# Patient Record
Sex: FEMALE | Race: WHITE | NOT HISPANIC OR LATINO | Employment: FULL TIME | URBAN - METROPOLITAN AREA
[De-identification: names, ages, dates, MRNs, and addresses within clinical notes are randomized per-mention and may not be internally consistent; named-entity substitution may affect disease eponyms.]

---

## 2019-05-14 LAB
HUMAN PAPILLOMAVIRUS (HPV): POSITIVE
PAP RECOMMENDATION EXT: NORMAL
PAP SMEAR: NORMAL

## 2023-05-04 ENCOUNTER — TELEPHONE (OUTPATIENT)
Dept: FAMILY MEDICINE | Facility: CLINIC | Age: 33
End: 2023-05-04
Payer: COMMERCIAL

## 2023-05-04 NOTE — TELEPHONE ENCOUNTER
I spoke to the patient regarding her appointment on 5/9/23 informing her that this appointment will have to be in the clinic instead of virtual. Pt verbally understood.

## 2023-05-09 ENCOUNTER — OFFICE VISIT (OUTPATIENT)
Dept: FAMILY MEDICINE | Facility: CLINIC | Age: 33
End: 2023-05-09
Payer: COMMERCIAL

## 2023-05-09 VITALS
RESPIRATION RATE: 18 BRPM | HEART RATE: 66 BPM | WEIGHT: 268.19 LBS | OXYGEN SATURATION: 98 % | HEIGHT: 66 IN | DIASTOLIC BLOOD PRESSURE: 74 MMHG | TEMPERATURE: 98 F | SYSTOLIC BLOOD PRESSURE: 120 MMHG | BODY MASS INDEX: 43.1 KG/M2

## 2023-05-09 DIAGNOSIS — F43.10 PTSD (POST-TRAUMATIC STRESS DISORDER): ICD-10-CM

## 2023-05-09 DIAGNOSIS — G47.00 INSOMNIA, UNSPECIFIED TYPE: ICD-10-CM

## 2023-05-09 DIAGNOSIS — Z76.89 ENCOUNTER TO ESTABLISH CARE: Primary | ICD-10-CM

## 2023-05-09 DIAGNOSIS — F31.9 BIPOLAR 1 DISORDER: ICD-10-CM

## 2023-05-09 DIAGNOSIS — F32.A DEPRESSION, UNSPECIFIED DEPRESSION TYPE: ICD-10-CM

## 2023-05-09 PROCEDURE — 99999 PR PBB SHADOW E&M-EST. PATIENT-LVL IV: ICD-10-PCS | Mod: PBBFAC,,, | Performed by: FAMILY MEDICINE

## 2023-05-09 PROCEDURE — 99999 PR PBB SHADOW E&M-EST. PATIENT-LVL IV: CPT | Mod: PBBFAC,,, | Performed by: FAMILY MEDICINE

## 2023-05-09 PROCEDURE — 99203 PR OFFICE/OUTPT VISIT, NEW, LEVL III, 30-44 MIN: ICD-10-PCS | Mod: S$GLB,,, | Performed by: FAMILY MEDICINE

## 2023-05-09 PROCEDURE — 99203 OFFICE O/P NEW LOW 30 MIN: CPT | Mod: S$GLB,,, | Performed by: FAMILY MEDICINE

## 2023-05-09 RX ORDER — QUETIAPINE FUMARATE 200 MG/1
200 TABLET, FILM COATED ORAL NIGHTLY PRN
Qty: 30 TABLET | Refills: 5 | Status: SHIPPED | OUTPATIENT
Start: 2023-05-09 | End: 2024-02-01

## 2023-05-09 RX ORDER — CARIPRAZINE 1.5 MG/1
1.5 CAPSULE, GELATIN COATED ORAL DAILY
Qty: 90 CAPSULE | Refills: 3 | Status: SHIPPED | OUTPATIENT
Start: 2023-05-09 | End: 2023-08-16 | Stop reason: SDUPTHER

## 2023-05-09 RX ORDER — ESCITALOPRAM OXALATE 10 MG/1
10 TABLET ORAL DAILY
Qty: 90 TABLET | Refills: 3 | Status: SHIPPED | OUTPATIENT
Start: 2023-05-09 | End: 2024-03-05

## 2023-05-09 RX ORDER — CLONIDINE HYDROCHLORIDE 0.1 MG/1
0.1 TABLET ORAL 2 TIMES DAILY PRN
Qty: 60 TABLET | Refills: 5 | Status: SHIPPED | OUTPATIENT
Start: 2023-05-09 | End: 2024-05-08

## 2023-05-09 RX ORDER — NALTREXONE HYDROCHLORIDE 50 MG/1
50 TABLET, FILM COATED ORAL DAILY
Qty: 90 TABLET | Refills: 3 | Status: SHIPPED | OUTPATIENT
Start: 2023-05-09 | End: 2024-02-01 | Stop reason: SDUPTHER

## 2023-05-09 RX ORDER — PROPRANOLOL HYDROCHLORIDE 20 MG/1
20 TABLET ORAL 2 TIMES DAILY PRN
Qty: 60 TABLET | Refills: 5 | Status: SHIPPED | OUTPATIENT
Start: 2023-05-09 | End: 2024-02-01

## 2023-05-09 NOTE — PROGRESS NOTES
[facility] - Clinic Note    Subjective      Ms. Rosario is a 33 y.o. female who presents to clinic for Establish Care (REFILLS)    Patient has a history of bipolar disorder, depression, PTSD.  Recently moved to the area from [place].  Currently taking Vraylar, Lexapro, naltrexone which seems to control her symptoms well.  Intermittently takes Seroquel as needed for insomnia as well as clonidine and propanolol as needed for panic symptoms.    PM Emely has a past medical history of Bipolar disorder, Depression, PTSD (post-traumatic stress disorder), and Substance abuse.   PSXH Emely has a past surgical history that includes Breast surgery; Breast mass excision (Left, 2018); and  section (2019).    Emely's family history includes Alcohol abuse in her mother; Anxiety disorder in her mother; Cancer in her mother; Depression in her mother.    Emely reports that she has quit smoking. Her smoking use included cigarettes. She has never used smokeless tobacco. No history on file for alcohol use and drug use.   ALG Emely is allergic to latex.   MED Emely has a current medication list which includes the following prescription(s): vraylar, clonidine, escitalopram oxalate, naltrexone, propranolol, and quetiapine.     Review of Systems   Constitutional:  Positive for unexpected weight change. Negative for activity change.   HENT:  Negative for hearing loss, rhinorrhea and trouble swallowing.    Eyes:  Negative for discharge and visual disturbance.   Respiratory:  Negative for chest tightness and wheezing.    Cardiovascular:  Negative for chest pain and palpitations.   Gastrointestinal:  Negative for blood in stool, constipation, diarrhea and vomiting.   Endocrine: Negative for polydipsia and polyuria.   Genitourinary:  Negative for difficulty urinating, dysuria, hematuria and menstrual problem.   Musculoskeletal:  Negative for arthralgias, joint swelling and neck pain.   Neurological:  Negative  "for weakness and headaches.   Psychiatric/Behavioral:  Negative for confusion and dysphoric mood.    Objective     /74 (BP Location: Left arm, Patient Position: Sitting, BP Method: Large (Manual))   Pulse 66   Temp 98.2 °F (36.8 °C) (Temporal)   Resp 18   Ht 5' 6" (1.676 m)   Wt 121.7 kg (268 lb 3.2 oz)   LMP 2023 Comment: HAS NEXPLANON INSERTION THAT'S  IN   SpO2 98%   BMI 43.29 kg/m²     Physical Exam  Vitals and nursing note reviewed.   Constitutional:       General: She is not in acute distress.     Appearance: Normal appearance. She is well-developed. She is not diaphoretic.   HENT:      Head: Normocephalic and atraumatic.      Right Ear: External ear normal.      Left Ear: External ear normal.   Eyes:      General:         Right eye: No discharge.         Left eye: No discharge.   Cardiovascular:      Rate and Rhythm: Normal rate and regular rhythm.      Heart sounds: Normal heart sounds.   Pulmonary:      Effort: Pulmonary effort is normal.      Breath sounds: Normal breath sounds. No wheezing or rales.   Skin:     General: Skin is warm and dry.   Neurological:      Mental Status: She is alert and oriented to person, place, and time. Mental status is at baseline.   Psychiatric:         Mood and Affect: Mood normal.         Behavior: Behavior normal.         Thought Content: Thought content normal.         Judgment: Judgment normal.      Assessment/Plan     1. Encounter to establish care        2. PTSD (post-traumatic stress disorder)  propranoloL (INDERAL) 20 MG tablet    EScitalopram oxalate (LEXAPRO) 10 MG tablet    cariprazine (VRAYLAR) 1.5 mg Cap    naltrexone (DEPADE) 50 mg tablet    cloNIDine (CATAPRES) 0.1 MG tablet      3. Depression, unspecified depression type  propranoloL (INDERAL) 20 MG tablet    EScitalopram oxalate (LEXAPRO) 10 MG tablet    cariprazine (VRAYLAR) 1.5 mg Cap    naltrexone (DEPADE) 50 mg tablet    cloNIDine (CATAPRES) 0.1 MG tablet      4. Insomnia, " unspecified type  QUEtiapine (SEROQUEL) 200 MG Tab      5. Bipolar 1 disorder  propranoloL (INDERAL) 20 MG tablet    EScitalopram oxalate (LEXAPRO) 10 MG tablet    cariprazine (VRAYLAR) 1.5 mg Cap    naltrexone (DEPADE) 50 mg tablet    cloNIDine (CATAPRES) 0.1 MG tablet        Patient controlled on current medications.  Will continue.  [facility] smear and Nexplanon replacement.  Follow-up in 6 months.  Will obtain records from previous physician for review.    No future appointments.      Wood Oscar MD  Family Medicine  [facility]

## 2023-05-10 DIAGNOSIS — Z11.59 NEED FOR HEPATITIS C SCREENING TEST: ICD-10-CM

## 2023-05-18 ENCOUNTER — HOSPITAL ENCOUNTER (OUTPATIENT)
Dept: RADIOLOGY | Facility: HOSPITAL | Age: 33
Discharge: HOME | End: 2023-05-18
Attending: SURGERY
Payer: COMMERCIAL

## 2023-05-18 ENCOUNTER — HOSPITAL ENCOUNTER (OUTPATIENT)
Dept: CARDIOLOGY | Facility: HOSPITAL | Age: 33
Discharge: HOME | End: 2023-05-18
Attending: SURGERY
Payer: COMMERCIAL

## 2023-05-18 DIAGNOSIS — R07.9 CHEST PAIN, UNSPECIFIED: ICD-10-CM

## 2023-05-18 DIAGNOSIS — R12 HEARTBURN: ICD-10-CM

## 2023-05-18 DIAGNOSIS — I10 HYPERTENSION: ICD-10-CM

## 2023-05-18 DIAGNOSIS — E78.5 DYSLIPIDEMIA: ICD-10-CM

## 2023-05-18 DIAGNOSIS — M54.9 BACK PAIN: ICD-10-CM

## 2023-05-18 DIAGNOSIS — R06.02 SHORTNESS OF BREATH: ICD-10-CM

## 2023-05-18 DIAGNOSIS — K21.00 REFLUX ESOPHAGITIS: ICD-10-CM

## 2023-05-18 DIAGNOSIS — R53.83 FATIGUE: ICD-10-CM

## 2023-05-18 DIAGNOSIS — R07.9 CHEST PAIN: ICD-10-CM

## 2023-05-18 PROCEDURE — A9698 NON-RAD CONTRAST MATERIALNOC: HCPCS

## 2023-05-18 PROCEDURE — 93005 ELECTROCARDIOGRAM TRACING: CPT

## 2023-05-18 PROCEDURE — 71046 X-RAY EXAM CHEST 2 VIEWS: CPT | Mod: TC

## 2023-05-18 PROCEDURE — 93010 ELECTROCARDIOGRAM REPORT: CPT | Mod: ,,, | Performed by: INTERNAL MEDICINE

## 2023-05-18 PROCEDURE — 93010 EKG 12-LEAD: ICD-10-PCS | Mod: ,,, | Performed by: INTERNAL MEDICINE

## 2023-05-18 PROCEDURE — 71046 XR CHEST PA AND LATERAL: ICD-10-PCS | Mod: 26,,, | Performed by: RADIOLOGY

## 2023-05-18 PROCEDURE — 74240 X-RAY XM UPR GI TRC 1CNTRST: CPT | Mod: 26,,, | Performed by: RADIOLOGY

## 2023-05-18 PROCEDURE — 25500020 PHARM REV CODE 255

## 2023-05-18 PROCEDURE — 74240 FL UPPER GI: ICD-10-PCS | Mod: 26,,, | Performed by: RADIOLOGY

## 2023-05-18 PROCEDURE — 71046 X-RAY EXAM CHEST 2 VIEWS: CPT | Mod: 26,,, | Performed by: RADIOLOGY

## 2023-05-18 PROCEDURE — 74240 X-RAY XM UPR GI TRC 1CNTRST: CPT | Mod: TC

## 2023-05-18 RX ADMIN — BARIUM SULFATE 300 ML: 0.6 SUSPENSION ORAL at 09:05

## 2023-05-18 RX ADMIN — BARIUM SULFATE 80 ML: 980 POWDER, FOR SUSPENSION ORAL at 09:05

## 2023-06-09 ENCOUNTER — LAB VISIT (OUTPATIENT)
Dept: LAB | Facility: HOSPITAL | Age: 33
End: 2023-06-09
Attending: FAMILY MEDICINE
Payer: COMMERCIAL

## 2023-06-09 DIAGNOSIS — G47.33 OSA (OBSTRUCTIVE SLEEP APNEA): ICD-10-CM

## 2023-06-09 DIAGNOSIS — R06.02 SOB (SHORTNESS OF BREATH): ICD-10-CM

## 2023-06-09 DIAGNOSIS — M19.90 ARTHRITIS: ICD-10-CM

## 2023-06-09 DIAGNOSIS — R53.83 FATIGUE: Primary | ICD-10-CM

## 2023-06-09 DIAGNOSIS — E78.5 DYSLIPIDEMIA: ICD-10-CM

## 2023-06-09 DIAGNOSIS — I10 HTN (HYPERTENSION): ICD-10-CM

## 2023-06-09 LAB
ALBUMIN SERPL BCP-MCNC: 3.9 G/DL (ref 3.5–5.2)
ALP SERPL-CCNC: 83 U/L (ref 55–135)
ALT SERPL W/O P-5'-P-CCNC: 14 U/L (ref 10–44)
ANION GAP SERPL CALC-SCNC: 9 MMOL/L (ref 8–16)
AST SERPL-CCNC: 15 U/L (ref 10–40)
BASOPHILS # BLD AUTO: 0.02 K/UL (ref 0–0.2)
BASOPHILS NFR BLD: 0.4 % (ref 0–1.9)
BILIRUB SERPL-MCNC: 0.7 MG/DL (ref 0.1–1)
BUN SERPL-MCNC: 12 MG/DL (ref 6–20)
CALCIUM SERPL-MCNC: 9.6 MG/DL (ref 8.7–10.5)
CHLORIDE SERPL-SCNC: 107 MMOL/L (ref 95–110)
CHOLEST SERPL-MCNC: 164 MG/DL (ref 120–199)
CHOLEST/HDLC SERPL: 4.6 {RATIO} (ref 2–5)
CO2 SERPL-SCNC: 25 MMOL/L (ref 23–29)
CREAT SERPL-MCNC: 0.9 MG/DL (ref 0.5–1.4)
DIFFERENTIAL METHOD: NORMAL
EOSINOPHIL # BLD AUTO: 0 K/UL (ref 0–0.5)
EOSINOPHIL NFR BLD: 0.8 % (ref 0–8)
ERYTHROCYTE [DISTWIDTH] IN BLOOD BY AUTOMATED COUNT: 12 % (ref 11.5–14.5)
EST. GFR  (NO RACE VARIABLE): >60 ML/MIN/1.73 M^2
GLUCOSE SERPL-MCNC: 86 MG/DL (ref 70–110)
HCT VFR BLD AUTO: 41 % (ref 37–48.5)
HDLC SERPL-MCNC: 36 MG/DL (ref 40–75)
HDLC SERPL: 22 % (ref 20–50)
HGB BLD-MCNC: 14 G/DL (ref 12–16)
IMM GRANULOCYTES # BLD AUTO: 0.02 K/UL (ref 0–0.04)
IMM GRANULOCYTES NFR BLD AUTO: 0.4 % (ref 0–0.5)
LDLC SERPL CALC-MCNC: 105.4 MG/DL (ref 63–159)
LYMPHOCYTES # BLD AUTO: 1.7 K/UL (ref 1–4.8)
LYMPHOCYTES NFR BLD: 32.6 % (ref 18–48)
MCH RBC QN AUTO: 30.8 PG (ref 27–31)
MCHC RBC AUTO-ENTMCNC: 34.1 G/DL (ref 32–36)
MCV RBC AUTO: 90 FL (ref 82–98)
MONOCYTES # BLD AUTO: 0.4 K/UL (ref 0.3–1)
MONOCYTES NFR BLD: 7.2 % (ref 4–15)
NEUTROPHILS # BLD AUTO: 3.1 K/UL (ref 1.8–7.7)
NEUTROPHILS NFR BLD: 58.6 % (ref 38–73)
NONHDLC SERPL-MCNC: 128 MG/DL
NRBC BLD-RTO: 0 /100 WBC
PLATELET # BLD AUTO: 198 K/UL (ref 150–450)
PMV BLD AUTO: 10.8 FL (ref 9.2–12.9)
POTASSIUM SERPL-SCNC: 3.9 MMOL/L (ref 3.5–5.1)
PROT SERPL-MCNC: 6.7 G/DL (ref 6–8.4)
RBC # BLD AUTO: 4.54 M/UL (ref 4–5.4)
SODIUM SERPL-SCNC: 141 MMOL/L (ref 136–145)
T3FREE SERPL-MCNC: 2.4 PG/ML (ref 2.3–4.2)
TRIGL SERPL-MCNC: 113 MG/DL (ref 30–150)
TSH SERPL DL<=0.005 MIU/L-ACNC: 2.49 UIU/ML (ref 0.4–4)
WBC # BLD AUTO: 5.27 K/UL (ref 3.9–12.7)

## 2023-06-09 PROCEDURE — 80053 COMPREHEN METABOLIC PANEL: CPT | Performed by: SURGERY

## 2023-06-09 PROCEDURE — 80061 LIPID PANEL: CPT | Performed by: SURGERY

## 2023-06-09 PROCEDURE — 85025 COMPLETE CBC W/AUTO DIFF WBC: CPT | Performed by: SURGERY

## 2023-06-09 PROCEDURE — 84481 FREE ASSAY (FT-3): CPT | Performed by: SURGERY

## 2023-06-09 PROCEDURE — 84443 ASSAY THYROID STIM HORMONE: CPT | Performed by: SURGERY

## 2023-06-09 PROCEDURE — 84479 ASSAY OF THYROID (T3 OR T4): CPT | Performed by: SURGERY

## 2023-06-11 LAB — TOTAL TBC SERPL: 1 TBI (ref 0.8–1.3)

## 2023-08-16 DIAGNOSIS — F31.9 BIPOLAR 1 DISORDER: ICD-10-CM

## 2023-08-16 DIAGNOSIS — F43.10 PTSD (POST-TRAUMATIC STRESS DISORDER): ICD-10-CM

## 2023-08-16 DIAGNOSIS — F32.A DEPRESSION, UNSPECIFIED DEPRESSION TYPE: ICD-10-CM

## 2023-08-16 RX ORDER — CARIPRAZINE 1.5 MG/1
1.5 CAPSULE, GELATIN COATED ORAL DAILY
Qty: 90 CAPSULE | Refills: 3 | Status: SHIPPED | OUTPATIENT
Start: 2023-08-16 | End: 2024-02-01

## 2023-08-22 ENCOUNTER — PATIENT MESSAGE (OUTPATIENT)
Dept: ADMINISTRATIVE | Facility: HOSPITAL | Age: 33
End: 2023-08-22
Payer: COMMERCIAL

## 2023-08-25 ENCOUNTER — PATIENT MESSAGE (OUTPATIENT)
Dept: ADMINISTRATIVE | Facility: HOSPITAL | Age: 33
End: 2023-08-25
Payer: COMMERCIAL

## 2023-08-25 ENCOUNTER — PATIENT OUTREACH (OUTPATIENT)
Dept: ADMINISTRATIVE | Facility: HOSPITAL | Age: 33
End: 2023-08-25
Payer: COMMERCIAL

## 2023-08-25 NOTE — PROGRESS NOTES
Population Health Chart Review & Patient Outreach Details:     Additional Care Coordinator Notes:      Reason for Outreach Encounter:     []  Non-Compliant Report   []  Payor Report (Humana, PHN, BCBS, MSSP, MCIP, UHC, etc.)   []  Chart Review   []  Weekly Bulk Order Report- Order placed               [x]  Epic Campaign     Updates Requested / Reviewed:     []  Care Everywhere    []     []  External Sources ([facility], [facility], DIS, LINKS (updated Immunizations) , etc.)   []  Care Team Updated    Patient Outreach Method:      []  Telephone Outreach Completed   [] Successful   [] Left Voicemail   [] Unable to Contact (wrong number, no voicemail)  [x]  [facility] Outreach Sent  []  Letter Outreach Mailed        Health Maintenance Topics Addressed and Outreach Outcomes / Actions Taken:        []      Breast Cancer Screening  I []  Mammo Scheduled      []  External Records Requested     []  Patient Declined     []  Patient Will Call Back to Schedule     []  Patient Will Schedule with External Provider / Order Routed if Applicable     [] MAMMOGRAM ORDERED     [] External Records Uploaded             [x]       Cervical Cancer Screening []  Pap Scheduled      []  External Records Requested     []  Patient Declined     []  Patient Will Call Back to Schedule     []  Patient Will Schedule with External Provider     [] External Records Uploaded               []          Colorectal Cancer Screening [] Colonoscopy Case Request or Referral Placed     []  External Records Requested     []  Patient Declined     []  Patient Will Call Back to Schedule     []  Patient Will Schedule with External Provider     []  Fit Kit Mailed (add the SmartPhrase under additional notes)     []  Reminded Patient to Complete Home Test     [] FIT KIT ORDERED     [] External Records Uploaded             []      Diabetic Eye Exam []  Eye Camera Scheduled or Optometry Referral Placed     []  External Records Requested     []  Patient  Declined     []  Patient Will Call Back to Schedule     []  Patient Will Schedule with External Provider     [] External Records Uploaded             []      Blood Pressure Control []  Primary Care Follow Up Visit Scheduled     []  Remote Blood Pressure Reading Captured     []  Patient Declined     []  Patient Will Call Back / Patient Will Send Portal Message with Reading     []  Patient Will Call Back to Schedule Provider Visit             []       HbA1c & Other Labs []  Lab Appt Scheduled for Due Labs     []  Primary Care Follow Up Visit Scheduled      []  Reminded Patient to Complete Home Test     []  Patient Declined     []  Patient Will Call Back to Schedule     []  Patient Will Schedule with External Provider / Order Routed if Applicable     [] LABS ORDERED     [] External Records Uploaded           []    Schedule Primary Care Appt []  Primary Care Appt Scheduled     []  Patient Declined     []  Patient Will Call Back to Schedule     []  Pt Established with External Provider & Updated Care Team             []      Medication Adherence []  Primary Care Appointment Scheduled     []  Reminder Added to Upcoming Primary Care Appt Notes     []  Patient Reminded to  Prescription     []  Patient Declined, Provider Notified if Needed     []  Sent Provider Message to Review and/or Add Exclusion to Problem List             []      Osteoporosis Screening []  DXA Appointment Scheduled     []  External Records Requested     []  Patient Declined     []  Patient Will Call Back to Schedule     []  Patient Will Schedule with External Provider / Order Routed if Applicable     [] DEXA ORDERED     [] External Records Uploaded

## 2023-08-28 ENCOUNTER — PATIENT OUTREACH (OUTPATIENT)
Dept: ADMINISTRATIVE | Facility: HOSPITAL | Age: 33
End: 2023-08-28
Payer: COMMERCIAL

## 2023-08-28 NOTE — PROGRESS NOTES
Population Health Chart Review & Patient Outreach Details:     Additional Care Coordinator Notes:      Reason for Outreach Encounter:     [x]  Non-Compliant Report   []  Payor Report (Humana, PHN, BCBS, MSSP, MCIP, UHC, etc.)   []  Chart Review   []  Weekly Bulk Order Report- Order placed               []  Epic Campaign     Updates Requested / Reviewed:     []  Care Everywhere    []     []  External Sources ([facility]Corp, [facility], DIS, LINKS (updated Immunizations) , etc.)   []  Care Team Updated    Patient Outreach Method:      []  Telephone Outreach Completed   [] Successful   [] Left Voicemail   [] Unable to Contact (wrong number, no voicemail)  [x]  [facility] Portal Outreach Sent  []  Letter Outreach Mailed        Health Maintenance Topics Addressed and Outreach Outcomes / Actions Taken:        []      Breast Cancer Screening  I []  Mammo Scheduled      []  External Records Requested     []  Patient Declined     []  Patient Will Call Back to Schedule     []  Patient Will Schedule with External Provider / Order Routed if Applicable     [] MAMMOGRAM ORDERED     [] External Records Uploaded             [x]       Cervical Cancer Screening []  Pap Scheduled      []  External Records Requested     []  Patient Declined     []  Patient Will Call Back to Schedule     []  Patient Will Schedule with External Provider     [x] External Records Uploaded               []          Colorectal Cancer Screening [] Colonoscopy Case Request or Referral Placed     []  External Records Requested     []  Patient Declined     []  Patient Will Call Back to Schedule     []  Patient Will Schedule with External Provider     []  Fit Kit Mailed (add the SmartPhrase under additional notes)     []  Reminded Patient to Complete Home Test     [] FIT KIT ORDERED     [] External Records Uploaded             []      Diabetic Eye Exam []  Eye Camera Scheduled or Optometry Referral Placed     []  External Records Requested     []  Patient  Declined     []  Patient Will Call Back to Schedule     []  Patient Will Schedule with External Provider     [] External Records Uploaded             []      Blood Pressure Control []  Primary Care Follow Up Visit Scheduled     []  Remote Blood Pressure Reading Captured     []  Patient Declined     []  Patient Will Call Back / Patient Will Send Portal Message with Reading     []  Patient Will Call Back to Schedule Provider Visit             []       HbA1c & Other Labs []  Lab Appt Scheduled for Due Labs     []  Primary Care Follow Up Visit Scheduled      []  Reminded Patient to Complete Home Test     []  Patient Declined     []  Patient Will Call Back to Schedule     []  Patient Will Schedule with External Provider / Order Routed if Applicable     [] LABS ORDERED     [] External Records Uploaded           []    Schedule Primary Care Appt []  Primary Care Appt Scheduled     []  Patient Declined     []  Patient Will Call Back to Schedule     []  Pt Established with External Provider & Updated Care Team             []      Medication Adherence []  Primary Care Appointment Scheduled     []  Reminder Added to Upcoming Primary Care Appt Notes     []  Patient Reminded to  Prescription     []  Patient Declined, Provider Notified if Needed     []  Sent Provider Message to Review and/or Add Exclusion to Problem List             []      Osteoporosis Screening []  DXA Appointment Scheduled     []  External Records Requested     []  Patient Declined     []  Patient Will Call Back to Schedule     []  Patient Will Schedule with External Provider / Order Routed if Applicable     [] DEXA ORDERED     [] External Records Uploaded

## 2024-02-01 ENCOUNTER — OFFICE VISIT (OUTPATIENT)
Dept: FAMILY MEDICINE | Facility: CLINIC | Age: 34
End: 2024-02-01
Payer: COMMERCIAL

## 2024-02-01 VITALS
DIASTOLIC BLOOD PRESSURE: 60 MMHG | HEIGHT: 66 IN | WEIGHT: 214.38 LBS | HEART RATE: 76 BPM | RESPIRATION RATE: 18 BRPM | BODY MASS INDEX: 34.45 KG/M2 | SYSTOLIC BLOOD PRESSURE: 104 MMHG

## 2024-02-01 DIAGNOSIS — Z11.59 ENCOUNTER FOR SCREENING FOR VIRAL DISEASE: Primary | ICD-10-CM

## 2024-02-01 DIAGNOSIS — F31.9 BIPOLAR 1 DISORDER: ICD-10-CM

## 2024-02-01 DIAGNOSIS — F10.91 ALCOHOL USE DISORDER IN REMISSION: ICD-10-CM

## 2024-02-01 DIAGNOSIS — R79.9 ABNORMAL BLOOD CHEMISTRY: ICD-10-CM

## 2024-02-01 DIAGNOSIS — Z90.3 S/P GASTRIC SLEEVE PROCEDURE: ICD-10-CM

## 2024-02-01 PROCEDURE — 99395 PREV VISIT EST AGE 18-39: CPT | Mod: S$GLB,,, | Performed by: STUDENT IN AN ORGANIZED HEALTH CARE EDUCATION/TRAINING PROGRAM

## 2024-02-01 PROCEDURE — 3078F DIAST BP <80 MM HG: CPT | Mod: CPTII,S$GLB,, | Performed by: STUDENT IN AN ORGANIZED HEALTH CARE EDUCATION/TRAINING PROGRAM

## 2024-02-01 PROCEDURE — 3074F SYST BP LT 130 MM HG: CPT | Mod: CPTII,S$GLB,, | Performed by: STUDENT IN AN ORGANIZED HEALTH CARE EDUCATION/TRAINING PROGRAM

## 2024-02-01 PROCEDURE — 99999 PR PBB SHADOW E&M-EST. PATIENT-LVL III: CPT | Mod: PBBFAC,,, | Performed by: STUDENT IN AN ORGANIZED HEALTH CARE EDUCATION/TRAINING PROGRAM

## 2024-02-01 PROCEDURE — 1159F MED LIST DOCD IN RCRD: CPT | Mod: CPTII,S$GLB,, | Performed by: STUDENT IN AN ORGANIZED HEALTH CARE EDUCATION/TRAINING PROGRAM

## 2024-02-01 PROCEDURE — 1160F RVW MEDS BY RX/DR IN RCRD: CPT | Mod: CPTII,S$GLB,, | Performed by: STUDENT IN AN ORGANIZED HEALTH CARE EDUCATION/TRAINING PROGRAM

## 2024-02-01 PROCEDURE — 3008F BODY MASS INDEX DOCD: CPT | Mod: CPTII,S$GLB,, | Performed by: STUDENT IN AN ORGANIZED HEALTH CARE EDUCATION/TRAINING PROGRAM

## 2024-02-01 RX ORDER — NALTREXONE HYDROCHLORIDE 50 MG/1
50 TABLET, FILM COATED ORAL DAILY
Qty: 90 TABLET | Refills: 3 | Status: SHIPPED | OUTPATIENT
Start: 2024-02-01

## 2024-02-01 RX ORDER — CARIPRAZINE 3 MG/1
3 CAPSULE, GELATIN COATED ORAL DAILY
Qty: 90 CAPSULE | Refills: 3 | Status: SHIPPED | OUTPATIENT
Start: 2024-02-01

## 2024-02-01 NOTE — PROGRESS NOTES
[facility] Note    Subjective:    The HPI and pertinent ROS is included in the Diagnostic Impression Remarks section at the end of the note. Please see below for further details. Chief complaint is at end of note.     Emely is a pleasant intelligent patient who is here for evaluation.     Modified Medications    Modified Medication Previous Medication    NALTREXONE (DEPADE) 50 MG TABLET naltrexone (DEPADE) 50 mg tablet       Take 50 mg by mouth once daily.    Take 50 mg by mouth once daily.       Data reviewed 274}  Previous medical records reviewed and summarized in plan section at end of note.      If you are due for any health screening(s) below please notify me so we can arrange them to be ordered and scheduled. Most healthy patients at your age complete them, but you are free to accept or refuse. If you can't do it, I'll definitely understand. If you can, I'd certainly appreciate it!     All of your core healthy metrics are met.      The following portions of the patient's history were reviewed and updated as appropriate: allergies, current medications, past family history, past medical history, past social history, past surgical history and problem list.    She  has a past medical history of Bipolar disorder, Depression, PTSD (post-traumatic stress disorder), and Substance abuse.  She  has a past surgical history that includes Breast surgery; Breast mass excision (Left, 2018);  section (2019); and gastric sleve (Bilateral, 2023).    She  reports that she has quit smoking. Her smoking use included cigarettes. She has never used smokeless tobacco.  She family history includes Alcohol abuse in her mother; Anxiety disorder in her mother; Cancer in her mother; Depression in her mother.    Review of patient's allergies indicates:   Allergen Reactions    Latex Rash     Itchy, red splotchy.  Reports lips feel tingling when blowing up balloons.       Tobacco Use: Medium Risk (2024)  "   Patient History     Smoking Tobacco Use: Former     Smokeless Tobacco Use: Never     Passive Exposure: Not on file     Physical Examination  General appearance: alert, cooperative, no distress  Neck: no thyromegaly, no neck stiffness  Lungs: clear to auscultation, no wheezes, rales or rhonchi, symmetric air entry  Heart: normal rate, regular rhythm, normal S1, S2, no murmurs, rubs, clicks or gallops  Abdomen: soft, nontender, nondistended, no rigidity, rebound, or guarding.   Back: no point tenderness over spine  Extremities: peripheral pulses normal, no unilateral leg swelling or calf tenderness   Neurological:alert, oriented, normal speech, no new focal findings or movement disorder noted from baseline    BP Readings from Last 3 Encounters:   02/01/24 104/60   05/09/23 120/74     Wt Readings from Last 3 Encounters:   02/01/24 97.3 kg (214 lb 6.4 oz)   05/09/23 121.7 kg (268 lb 3.2 oz)     /60 (BP Location: Left arm, Patient Position: Sitting, BP Method: Large (Manual))   Pulse 76   Resp 18   Ht 5' 6" (1.676 m)   Wt 97.3 kg (214 lb 6.4 oz)   LMP 01/11/2024 (Approximate)   BMI 34.61 kg/m²    274}  Laboratory: I have reviewed old labs below:    274}    Lab Results   Component Value Date    WBC 5.27 06/09/2023    HGB 14.0 06/09/2023    HCT 41.0 06/09/2023    MCV 90 06/09/2023     06/09/2023     06/09/2023    K 3.9 06/09/2023     06/09/2023    CALCIUM 9.6 06/09/2023    CO2 25 06/09/2023    GLU 86 06/09/2023    BUN 12 06/09/2023    CREATININE 0.9 06/09/2023    EGFRNORACEVR >60.0 06/09/2023    ANIONGAP 9 06/09/2023    PROT 6.7 06/09/2023    ALBUMIN 3.9 06/09/2023    BILITOT 0.7 06/09/2023    ALKPHOS 83 06/09/2023    ALT 14 06/09/2023    AST 15 06/09/2023    CHOL 164 06/09/2023    TRIG 113 06/09/2023    HDL 36 (L) 06/09/2023    LDLCALC 105.4 06/09/2023    TSH 2.491 06/09/2023      Lab reviewed by me: Particular labs of significance that I will monitor, workup, or treat to improve are " "mentioned below in diagnostic impression remarks.    Imaging/EKG: I have reviewed the pertinent results and my findings are noted in remarks.  274}    CC:   Chief Complaint   Patient presents with    Establish Care    Medication Refill     Naltrexone refill         274}    Assessment/Plan  Emely Rosario is a 33 y.o. female who presents to clinic with:  1. Encounter for screening for viral disease    2. Bipolar 1 disorder    3. Abnormal blood chemistry    4. S/P gastric sleeve procedure    5. BMI 34.0-34.9,adult    6. Alcohol use disorder in remission       274}  Diagnostic Impression Remarks + HPI     Documentation entered by me for this encounter may have been done in part using speech-recognition technology. Although I have made an effort to ensure accuracy, "sound like" errors may exist and should be interpreted in context.     Bipolar 1: is on lexapro which tipped patient over to manic episodes when increased lexapro by prior specialist. Patient noted that her depression is worsening. Will increase vraylar and it is benefitial to taper off of lexapro d/t risk of another manic episodes. Patient to keep me inform of any developments of her symptoms  S/p gastric sleeve: uncomplicated surgery per patient. Patient happy with the results. Seeing nutritionist. Patient following with surgeon. Will place orders for labs   EtOH use in remission:sober for more than 1 year. Continue with naltrexone.     This is the extent of this pleasant patient's concerns at this present time. She did not feel chest pain upon exertion, dyspnea, nausea, vomiting, diaphoresis, or syncope. No pleuritic chest pain, unilateral leg swelling, calf tenderness, or calf pain. Negative for unintentional weight loss night sweats, hematuria, and fevers. Emely will return to clinic in a few months for further workup and reassessment or sooner as needed. She was instructed to call the clinic or go to the emergency department or urgent care " immediately if her symptoms do not improve, worsens, or if any new symptoms develop. As we discussed that symptoms could worsen over the next 24 hours she was advised that if any increased swelling, pain, or numbness arise to go immediately to the ED. Patient knows to call any time if an emergency arises. Shared decision making occurred and she verbalized understanding in agreement with this plan. I discussed imaging findings, diagnosis, possibilities, treatment options, medications, risks, and benefits. She had many questions regarding the options and long-term effects. All questions were answered. She expressed understanding after counseling regarding the diagnosis and recommendations. She was capable and demonstrated competence with understanding of these options. Shared decision making was performed resulting in her choosing the current treatment plan. Patient handout was given with instructions and recommendations. Advised the patient that if they become pregnant to alert us immediately to assess for medication changes. Having a healthy weight can decrease the risk of 13 cancers and is an important goal. I also discussed the importance of close follow up to discuss labs, change or modify her medications if needed, monitor side effects, and further evaluation of medical problems.     Additional workup planned: see labs ordered below.    See below for labs and meds ordered with associated diagnosis      1. Bipolar 1 disorder  - cariprazine (VRAYLAR) 3 mg Cap; Take 1 capsule (3 mg total) by mouth once daily.  Dispense: 90 capsule; Refill: 3    2. Encounter for screening for viral disease  - HIV 1/2 Ag/Ab (4th Gen); Future    3. Abnormal blood chemistry  - CBC Auto Differential; Future  - Comprehensive Metabolic Panel; Future  - Hemoglobin A1C; Future    4. S/P gastric sleeve procedure  - Vitamin B12; Future  - Folate; Future  - Iron and TIBC; Future  - Ferritin; Future    5. BMI 34.0-34.9,adult  - Lipid Panel;  Future  - Vitamin D 25-Hydroxy; Future  - TSH; Future    6. Alcohol use disorder in remission  - naltrexone (DEPADE) 50 mg tablet; Take 50 mg by mouth once daily.  Dispense: 90 tablet; Refill: 3      Xavi Mcdermott MD   274}    If you are due for any health screening(s) below please notify me so we can arrange them to be ordered and scheduled. Most healthy patients at your age complete them, but you are free to accept or refuse.     If you can't do it, I'll definitely understand. If you can, I'd certainly appreciate it!   All of your core healthy metrics are met.

## 2024-02-20 ENCOUNTER — LAB VISIT (OUTPATIENT)
Dept: LAB | Facility: HOSPITAL | Age: 34
End: 2024-02-20
Attending: STUDENT IN AN ORGANIZED HEALTH CARE EDUCATION/TRAINING PROGRAM
Payer: COMMERCIAL

## 2024-02-20 DIAGNOSIS — Z90.3 S/P GASTRIC SLEEVE PROCEDURE: ICD-10-CM

## 2024-02-20 DIAGNOSIS — Z11.59 ENCOUNTER FOR SCREENING FOR VIRAL DISEASE: ICD-10-CM

## 2024-02-20 DIAGNOSIS — R79.9 ABNORMAL BLOOD CHEMISTRY: ICD-10-CM

## 2024-02-20 LAB
25(OH)D3+25(OH)D2 SERPL-MCNC: 39 NG/ML (ref 30–96)
ALBUMIN SERPL BCP-MCNC: 4 G/DL (ref 3.5–5.2)
ALP SERPL-CCNC: 74 U/L (ref 55–135)
ALT SERPL W/O P-5'-P-CCNC: 14 U/L (ref 10–44)
ANION GAP SERPL CALC-SCNC: 9 MMOL/L (ref 8–16)
AST SERPL-CCNC: 16 U/L (ref 10–40)
BASOPHILS # BLD AUTO: 0.03 K/UL (ref 0–0.2)
BASOPHILS NFR BLD: 0.7 % (ref 0–1.9)
BILIRUB SERPL-MCNC: 0.7 MG/DL (ref 0.1–1)
BUN SERPL-MCNC: 11 MG/DL (ref 6–20)
CALCIUM SERPL-MCNC: 9.4 MG/DL (ref 8.7–10.5)
CHLORIDE SERPL-SCNC: 109 MMOL/L (ref 95–110)
CHOLEST SERPL-MCNC: 169 MG/DL (ref 120–199)
CHOLEST/HDLC SERPL: 4 {RATIO} (ref 2–5)
CO2 SERPL-SCNC: 26 MMOL/L (ref 23–29)
CREAT SERPL-MCNC: 0.8 MG/DL (ref 0.5–1.4)
DIFFERENTIAL METHOD BLD: NORMAL
EOSINOPHIL # BLD AUTO: 0 K/UL (ref 0–0.5)
EOSINOPHIL NFR BLD: 0.9 % (ref 0–8)
ERYTHROCYTE [DISTWIDTH] IN BLOOD BY AUTOMATED COUNT: 12.6 % (ref 11.5–14.5)
EST. GFR  (NO RACE VARIABLE): >60 ML/MIN/1.73 M^2
ESTIMATED AVG GLUCOSE: 91 MG/DL (ref 68–131)
FERRITIN SERPL-MCNC: 237 NG/ML (ref 20–300)
FOLATE SERPL-MCNC: 4.5 NG/ML (ref 4–24)
GLUCOSE SERPL-MCNC: 93 MG/DL (ref 70–110)
HBA1C MFR BLD: 4.8 % (ref 4–5.6)
HCT VFR BLD AUTO: 41 % (ref 37–48.5)
HDLC SERPL-MCNC: 42 MG/DL (ref 40–75)
HDLC SERPL: 24.9 % (ref 20–50)
HGB BLD-MCNC: 13.3 G/DL (ref 12–16)
HIV 1+2 AB+HIV1 P24 AG SERPL QL IA: NORMAL
IMM GRANULOCYTES # BLD AUTO: 0.01 K/UL (ref 0–0.04)
IMM GRANULOCYTES NFR BLD AUTO: 0.2 % (ref 0–0.5)
IRON SERPL-MCNC: 149 UG/DL (ref 30–160)
LDLC SERPL CALC-MCNC: 112.8 MG/DL (ref 63–159)
LYMPHOCYTES # BLD AUTO: 1.3 K/UL (ref 1–4.8)
LYMPHOCYTES NFR BLD: 28.9 % (ref 18–48)
MCH RBC QN AUTO: 30.5 PG (ref 27–31)
MCHC RBC AUTO-ENTMCNC: 32.4 G/DL (ref 32–36)
MCV RBC AUTO: 94 FL (ref 82–98)
MONOCYTES # BLD AUTO: 0.3 K/UL (ref 0.3–1)
MONOCYTES NFR BLD: 7 % (ref 4–15)
NEUTROPHILS # BLD AUTO: 2.7 K/UL (ref 1.8–7.7)
NEUTROPHILS NFR BLD: 62.3 % (ref 38–73)
NONHDLC SERPL-MCNC: 127 MG/DL
NRBC BLD-RTO: 0 /100 WBC
PLATELET # BLD AUTO: 225 K/UL (ref 150–450)
PMV BLD AUTO: 11.3 FL (ref 9.2–12.9)
POTASSIUM SERPL-SCNC: 4.1 MMOL/L (ref 3.5–5.1)
PROT SERPL-MCNC: 6.7 G/DL (ref 6–8.4)
RBC # BLD AUTO: 4.36 M/UL (ref 4–5.4)
SATURATED IRON: 54 % (ref 20–50)
SODIUM SERPL-SCNC: 144 MMOL/L (ref 136–145)
TOTAL IRON BINDING CAPACITY: 278 UG/DL (ref 250–450)
TRANSFERRIN SERPL-MCNC: 188 MG/DL (ref 200–375)
TRIGL SERPL-MCNC: 71 MG/DL (ref 30–150)
TSH SERPL DL<=0.005 MIU/L-ACNC: 1.14 UIU/ML (ref 0.4–4)
VIT B12 SERPL-MCNC: 253 PG/ML (ref 210–950)
WBC # BLD AUTO: 4.4 K/UL (ref 3.9–12.7)

## 2024-02-20 PROCEDURE — 80061 LIPID PANEL: CPT | Performed by: STUDENT IN AN ORGANIZED HEALTH CARE EDUCATION/TRAINING PROGRAM

## 2024-02-20 PROCEDURE — 82607 VITAMIN B-12: CPT | Performed by: STUDENT IN AN ORGANIZED HEALTH CARE EDUCATION/TRAINING PROGRAM

## 2024-02-20 PROCEDURE — 82728 ASSAY OF FERRITIN: CPT | Performed by: STUDENT IN AN ORGANIZED HEALTH CARE EDUCATION/TRAINING PROGRAM

## 2024-02-20 PROCEDURE — 36415 COLL VENOUS BLD VENIPUNCTURE: CPT | Performed by: STUDENT IN AN ORGANIZED HEALTH CARE EDUCATION/TRAINING PROGRAM

## 2024-02-20 PROCEDURE — 82746 ASSAY OF FOLIC ACID SERUM: CPT | Performed by: STUDENT IN AN ORGANIZED HEALTH CARE EDUCATION/TRAINING PROGRAM

## 2024-02-20 PROCEDURE — 83036 HEMOGLOBIN GLYCOSYLATED A1C: CPT | Performed by: STUDENT IN AN ORGANIZED HEALTH CARE EDUCATION/TRAINING PROGRAM

## 2024-02-20 PROCEDURE — 82306 VITAMIN D 25 HYDROXY: CPT | Performed by: STUDENT IN AN ORGANIZED HEALTH CARE EDUCATION/TRAINING PROGRAM

## 2024-02-20 PROCEDURE — 80053 COMPREHEN METABOLIC PANEL: CPT | Performed by: STUDENT IN AN ORGANIZED HEALTH CARE EDUCATION/TRAINING PROGRAM

## 2024-02-20 PROCEDURE — 85025 COMPLETE CBC W/AUTO DIFF WBC: CPT | Performed by: STUDENT IN AN ORGANIZED HEALTH CARE EDUCATION/TRAINING PROGRAM

## 2024-02-20 PROCEDURE — 84443 ASSAY THYROID STIM HORMONE: CPT | Performed by: STUDENT IN AN ORGANIZED HEALTH CARE EDUCATION/TRAINING PROGRAM

## 2024-02-20 PROCEDURE — 87389 HIV-1 AG W/HIV-1&-2 AB AG IA: CPT | Performed by: STUDENT IN AN ORGANIZED HEALTH CARE EDUCATION/TRAINING PROGRAM

## 2024-02-20 PROCEDURE — 83540 ASSAY OF IRON: CPT | Performed by: STUDENT IN AN ORGANIZED HEALTH CARE EDUCATION/TRAINING PROGRAM

## 2024-02-22 ENCOUNTER — PATIENT MESSAGE (OUTPATIENT)
Dept: ADMINISTRATIVE | Facility: HOSPITAL | Age: 34
End: 2024-02-22
Payer: COMMERCIAL

## 2024-03-15 ENCOUNTER — OFFICE VISIT (OUTPATIENT)
Dept: FAMILY MEDICINE | Facility: CLINIC | Age: 34
End: 2024-03-15
Payer: COMMERCIAL

## 2024-03-15 VITALS
OXYGEN SATURATION: 99 % | BODY MASS INDEX: 32.47 KG/M2 | HEART RATE: 67 BPM | DIASTOLIC BLOOD PRESSURE: 80 MMHG | HEIGHT: 66 IN | SYSTOLIC BLOOD PRESSURE: 110 MMHG | WEIGHT: 202 LBS | RESPIRATION RATE: 18 BRPM

## 2024-03-15 DIAGNOSIS — F31.9 BIPOLAR 1 DISORDER: Primary | ICD-10-CM

## 2024-03-15 DIAGNOSIS — Z90.3 S/P GASTRIC SLEEVE PROCEDURE: ICD-10-CM

## 2024-03-15 PROCEDURE — 1160F RVW MEDS BY RX/DR IN RCRD: CPT | Mod: CPTII,S$GLB,, | Performed by: STUDENT IN AN ORGANIZED HEALTH CARE EDUCATION/TRAINING PROGRAM

## 2024-03-15 PROCEDURE — 99214 OFFICE O/P EST MOD 30 MIN: CPT | Mod: S$GLB,,, | Performed by: STUDENT IN AN ORGANIZED HEALTH CARE EDUCATION/TRAINING PROGRAM

## 2024-03-15 PROCEDURE — 3079F DIAST BP 80-89 MM HG: CPT | Mod: CPTII,S$GLB,, | Performed by: STUDENT IN AN ORGANIZED HEALTH CARE EDUCATION/TRAINING PROGRAM

## 2024-03-15 PROCEDURE — 3008F BODY MASS INDEX DOCD: CPT | Mod: CPTII,S$GLB,, | Performed by: STUDENT IN AN ORGANIZED HEALTH CARE EDUCATION/TRAINING PROGRAM

## 2024-03-15 PROCEDURE — 3044F HG A1C LEVEL LT 7.0%: CPT | Mod: CPTII,S$GLB,, | Performed by: STUDENT IN AN ORGANIZED HEALTH CARE EDUCATION/TRAINING PROGRAM

## 2024-03-15 PROCEDURE — 1159F MED LIST DOCD IN RCRD: CPT | Mod: CPTII,S$GLB,, | Performed by: STUDENT IN AN ORGANIZED HEALTH CARE EDUCATION/TRAINING PROGRAM

## 2024-03-15 PROCEDURE — 99999 PR PBB SHADOW E&M-EST. PATIENT-LVL IV: CPT | Mod: PBBFAC,,, | Performed by: STUDENT IN AN ORGANIZED HEALTH CARE EDUCATION/TRAINING PROGRAM

## 2024-03-15 PROCEDURE — 3074F SYST BP LT 130 MM HG: CPT | Mod: CPTII,S$GLB,, | Performed by: STUDENT IN AN ORGANIZED HEALTH CARE EDUCATION/TRAINING PROGRAM

## 2024-03-15 NOTE — PROGRESS NOTES
[facility] Note    Subjective:    The HPI and pertinent ROS is included in the Diagnostic Impression Remarks section at the end of the note. Please see below for further details. Chief complaint is at end of note.     Emely is a pleasant intelligent patient who is here for evaluation.     Modified Medications    No medications on file       Data reviewed 274}  Previous medical records reviewed and summarized in plan section at end of note.      If you are due for any health screening(s) below please notify me so we can arrange them to be ordered and scheduled. Most healthy patients at your age complete them, but you are free to accept or refuse. If you can't do it, I'll definitely understand. If you can, I'd certainly appreciate it!     All of your core healthy metrics are met.      The following portions of the patient's history were reviewed and updated as appropriate: allergies, current medications, past family history, past medical history, past social history, past surgical history and problem list.    She  has a past medical history of Bipolar disorder, Depression, PTSD (post-traumatic stress disorder), and Substance abuse.  She  has a past surgical history that includes Breast surgery; Breast mass excision (Left, 2018);  section (2019); and gastric sleve (Bilateral, 2023).    She  reports that she has quit smoking. Her smoking use included cigarettes. She has never used smokeless tobacco. She reports that she does not drink alcohol and does not use drugs.  She family history includes Alcohol abuse in her mother; Anxiety disorder in her mother; Breast cancer in her mother; Cancer in her maternal grandfather and mother; Colon cancer in her mother; Depression in her mother; Ovarian cancer in her maternal aunt; Prostate cancer in her maternal grandfather.    Review of patient's allergies indicates:   Allergen Reactions    Latex Rash     Itchy, red splotchy.  Reports lips feel  "tingling when blowing up balloons.       Tobacco Use: Medium Risk (3/15/2024)    Patient History     Smoking Tobacco Use: Former     Smokeless Tobacco Use: Never     Passive Exposure: Not on file     Physical Examination  General appearance: alert, cooperative, no distress  Neck: no thyromegaly, no neck stiffness  Lungs: clear to auscultation, no wheezes, rales or rhonchi, symmetric air entry  Heart: normal rate, regular rhythm, normal S1, S2, no murmurs, rubs, clicks or gallops  Abdomen: soft, nontender, nondistended, no rigidity, rebound, or guarding.   Back: no point tenderness over spine  Extremities: peripheral pulses normal, no unilateral leg swelling or calf tenderness   Neurological:alert, oriented, normal speech, no new focal findings or movement disorder noted from baseline    BP Readings from Last 3 Encounters:   03/15/24 110/80   03/05/24 116/74   02/14/24 112/74     Wt Readings from Last 3 Encounters:   03/15/24 91.6 kg (202 lb)   03/05/24 91.6 kg (202 lb)   02/14/24 95.3 kg (210 lb)     /80 (BP Location: Left arm, Patient Position: Sitting, BP Method: Large (Manual))   Pulse 67   Resp 18   Ht 5' 6" (1.676 m)   Wt 91.6 kg (202 lb)   LMP 01/11/2024 (Approximate)   SpO2 99%   BMI 32.60 kg/m²    274}  Laboratory: I have reviewed old labs below:    274}    Lab Results   Component Value Date    WBC 4.40 02/20/2024    HGB 13.3 02/20/2024    HCT 41.0 02/20/2024    MCV 94 02/20/2024     02/20/2024     02/20/2024    K 4.1 02/20/2024     02/20/2024    CALCIUM 9.4 02/20/2024    CO2 26 02/20/2024    GLU 93 02/20/2024    BUN 11 02/20/2024    CREATININE 0.8 02/20/2024    EGFRNORACEVR >60.0 02/20/2024    ANIONGAP 9 02/20/2024    PROT 6.7 02/20/2024    ALBUMIN 4.0 02/20/2024    BILITOT 0.7 02/20/2024    ALKPHOS 74 02/20/2024    ALT 14 02/20/2024    AST 16 02/20/2024    CHOL 169 02/20/2024    TRIG 71 02/20/2024    HDL 42 02/20/2024    LDLCALC 112.8 02/20/2024    TSH 1.139 02/20/2024    " "HGBA1C 4.8 02/20/2024      Lab reviewed by me: Particular labs of significance that I will monitor, workup, or treat to improve are mentioned below in diagnostic impression remarks.    Imaging/EKG: I have reviewed the pertinent results and my findings are noted in remarks.  274}    CC: No chief complaint on file.       274}    Assessment/Plan  Emely Rosario is a 33 y.o. female who presents to clinic with:  1. Bipolar 1 disorder    2. S/P gastric sleeve procedure       274}  Diagnostic Impression Remarks + HPI     Documentation entered by me for this encounter may have been done in part using speech-recognition technology. Although I have made an effort to ensure accuracy, "sound like" errors may exist and should be interpreted in context.      Bipolar 1: discontinue lexapro at prior visit d/t it tipped her over to manic episode by prior provider. Increased vraylar to compensate and patient is doing well and declined offer to increase her dose.   S/p gastric sleep procedure: tolerated procedure well. Had regular follow specialist and nutritionist. However insurance changed so needs a new referral to bariatric surgeon nutritionist. Will come to pcp to have regular post gastric sleeve labs done.     Additional workup planned: see labs ordered below.    See below for labs and meds ordered with associated diagnosis      1. Bipolar 1 disorder    2. S/P gastric sleeve procedure  - Ambulatory referral/consult to Bariatric Surgery; Future  - Ambulatory Referral/Consult to Lifestyle Nutrition; Future      Xavi Mcdermott MD   274}    If you are due for any health screening(s) below please notify me so we can arrange them to be ordered and scheduled. Most healthy patients at your age complete them, but you are free to accept or refuse.     If you can't do it, I'll definitely understand. If you can, I'd certainly appreciate it!   All of your core healthy metrics are met.        "

## 2024-04-17 ENCOUNTER — TELEPHONE (OUTPATIENT)
Dept: FAMILY MEDICINE | Facility: CLINIC | Age: 34
End: 2024-04-17
Payer: COMMERCIAL

## 2024-04-17 NOTE — TELEPHONE ENCOUNTER
----- Message from Tarah Morris sent at 4/17/2024  3:33 PM CDT -----  Regarding: Appt  Contact: [phone]  Patient is calling to cancel for April 19. Please contact pt   PATIENT HAS PATENT PIV.  WILL RE-INSERT ADDITIONAL PIV IN AM FOR PROCEDURE.

## 2024-05-16 ENCOUNTER — TELEPHONE (OUTPATIENT)
Dept: BARIATRICS | Facility: CLINIC | Age: 34
End: 2024-05-16
Payer: COMMERCIAL

## 2024-05-21 ENCOUNTER — TELEPHONE (OUTPATIENT)
Dept: BARIATRICS | Facility: CLINIC | Age: 34
End: 2024-05-21
Payer: COMMERCIAL

## 2024-05-21 NOTE — TELEPHONE ENCOUNTER
----- Message from Annalise Orellana sent at 5/21/2024 11:11 AM CDT -----  Regarding: Reschedule Appointment  Contact: [phone]  Calling in regards to rescheduling appointment as soon as possible to the week of July 4th if possible. Please call and schedule.

## 2024-07-05 ENCOUNTER — OFFICE VISIT (OUTPATIENT)
Dept: FAMILY MEDICINE | Facility: CLINIC | Age: 34
End: 2024-07-05
Payer: COMMERCIAL

## 2024-07-05 VITALS
HEART RATE: 68 BPM | SYSTOLIC BLOOD PRESSURE: 98 MMHG | WEIGHT: 182 LBS | OXYGEN SATURATION: 97 % | BODY MASS INDEX: 29.25 KG/M2 | DIASTOLIC BLOOD PRESSURE: 60 MMHG | HEIGHT: 66 IN | RESPIRATION RATE: 18 BRPM

## 2024-07-05 DIAGNOSIS — F43.10 PTSD (POST-TRAUMATIC STRESS DISORDER): Primary | ICD-10-CM

## 2024-07-05 PROCEDURE — 99999 PR PBB SHADOW E&M-EST. PATIENT-LVL III: CPT | Mod: PBBFAC,,, | Performed by: STUDENT IN AN ORGANIZED HEALTH CARE EDUCATION/TRAINING PROGRAM

## 2024-07-05 RX ORDER — MELATONIN 5 MG
50 CAPSULE ORAL NIGHTLY
COMMUNITY

## 2024-07-05 RX ORDER — DESVENLAFAXINE SUCCINATE 50 MG/1
50 TABLET, EXTENDED RELEASE ORAL DAILY
COMMUNITY

## 2024-07-05 NOTE — PROGRESS NOTES
[facility] Note    Subjective:    The HPI and pertinent ROS is included in the Diagnostic Impression Remarks section at the end of the note. Please see below for further details. Chief complaint is at end of note.     Emely is a pleasant intelligent patient who is here for evaluation.     Modified Medications    No medications on file       Data reviewed 274}  Previous medical records reviewed and summarized in plan section at end of note.      If you are due for any health screening(s) below please notify me so we can arrange them to be ordered and scheduled. Most healthy patients at your age complete them, but you are free to accept or refuse. If you can't do it, I'll definitely understand. If you can, I'd certainly appreciate it!     All of your core healthy metrics are met.      The following portions of the patient's history were reviewed and updated as appropriate: allergies, current medications, past family history, past medical history, past social history, past surgical history and problem list.    She  has a past medical history of Bipolar disorder, Depression, PTSD (post-traumatic stress disorder), PTSD (post-traumatic stress disorder), and Substance abuse.  She  has a past surgical history that includes Breast surgery; Breast mass excision (Left, 2018);  section (2019); and gastric sleve (Bilateral, 2023).    She  reports that she has quit smoking. Her smoking use included cigarettes and vaping w/o nicotine. She started smoking about 6 months ago. She has never used smokeless tobacco. She reports that she does not drink alcohol and does not use drugs.  She family history includes Alcohol abuse in her mother; Anxiety disorder in her mother; Breast cancer in her mother; Cancer in her maternal grandfather and mother; Colon cancer in her mother; Depression in her mother; Ovarian cancer in her maternal aunt; Prostate cancer in her maternal grandfather.    Review of patient's  "allergies indicates:   Allergen Reactions    Latex Rash     Itchy, red splotchy.  Reports lips feel tingling when blowing up balloons.       Tobacco Use: Medium Risk (7/5/2024)    Patient History     Smoking Tobacco Use: Former     Smokeless Tobacco Use: Never     Passive Exposure: Not on file     Physical Examination  General appearance: alert, cooperative, no distress  Neck: no thyromegaly, no neck stiffness  Lungs: clear to auscultation, no wheezes, rales or rhonchi, symmetric air entry  Heart: normal rate, regular rhythm, normal S1, S2, no murmurs, rubs, clicks or gallops  Abdomen: soft, nontender, nondistended, no rigidity, rebound, or guarding.   Back: no point tenderness over spine  Extremities: peripheral pulses normal, no unilateral leg swelling or calf tenderness   Neurological:alert, oriented, normal speech, no new focal findings or movement disorder noted from baseline    BP Readings from Last 3 Encounters:   07/05/24 98/60   03/15/24 110/80   03/05/24 116/74     Wt Readings from Last 3 Encounters:   07/05/24 82.6 kg (182 lb)   03/15/24 91.6 kg (202 lb)   03/05/24 91.6 kg (202 lb)     BP 98/60 (BP Location: Left arm, Patient Position: Sitting, BP Method: Large (Manual))   Pulse 68   Resp 18   Ht 5' 6" (1.676 m)   Wt 82.6 kg (182 lb)   SpO2 97%   BMI 29.38 kg/m²    274}  Laboratory: I have reviewed old labs below:    274}    Lab Results   Component Value Date    WBC 4.40 02/20/2024    HGB 13.3 02/20/2024    HCT 41.0 02/20/2024    MCV 94 02/20/2024     02/20/2024     02/20/2024    K 4.1 02/20/2024     02/20/2024    CALCIUM 9.4 02/20/2024    CO2 26 02/20/2024    GLU 93 02/20/2024    BUN 11 02/20/2024    CREATININE 0.8 02/20/2024    EGFRNORACEVR >60.0 02/20/2024    ANIONGAP 9 02/20/2024    PROT 6.7 02/20/2024    ALBUMIN 4.0 02/20/2024    BILITOT 0.7 02/20/2024    ALKPHOS 74 02/20/2024    ALT 14 02/20/2024    AST 16 02/20/2024    CHOL 169 02/20/2024    TRIG 71 02/20/2024    HDL 42 " "02/20/2024    LDLCALC 112.8 02/20/2024    TSH 1.139 02/20/2024    HGBA1C 4.8 02/20/2024      Lab reviewed by me: Particular labs of significance that I will monitor, workup, or treat to improve are mentioned below in diagnostic impression remarks.    Imaging/EKG: I have reviewed the pertinent results and my findings are noted in remarks.  274}    CC:   Chief Complaint   Patient presents with    Follow-up     Inpatient stay at [facility] 06/19-06/24. Suicidal ideations.         274}    Assessment/Plan  Emely Rosario is a 34 y.o. female who presents to clinic with:  1. PTSD (post-traumatic stress disorder)       274}  Diagnostic Impression Remarks + HPI     Documentation entered by me for this encounter may have been done in part using speech-recognition technology. Although I have made an effort to ensure accuracy, "sound like" errors may exist and should be interpreted in context.      Patient presents today for hospital follow up for suicidal ideation and received intensive inpatient psychotherapy. Patient reports her symptoms has improved with resolution of suicidal ideation and has f/u w/ psychiatrist on 7/11. Patient requesting FMLA paperwork to be filled out    Additional workup planned: see labs ordered below.    See below for labs and meds ordered with associated diagnosis      1. PTSD (post-traumatic stress disorder)      Xavi Mcdermott MD   274}    If you are due for any health screening(s) below please notify me so we can arrange them to be ordered and scheduled. Most healthy patients at your age complete them, but you are free to accept or refuse.     If you can't do it, I'll definitely understand. If you can, I'd certainly appreciate it!   All of your core healthy metrics are met.          "

## 2024-09-10 ENCOUNTER — PATIENT MESSAGE (OUTPATIENT)
Dept: FAMILY MEDICINE | Facility: CLINIC | Age: 34
End: 2024-09-10
Payer: COMMERCIAL

## 2024-09-10 ENCOUNTER — TELEPHONE (OUTPATIENT)
Dept: FAMILY MEDICINE | Facility: CLINIC | Age: 34
End: 2024-09-10
Payer: COMMERCIAL

## 2024-09-16 ENCOUNTER — TELEPHONE (OUTPATIENT)
Dept: FAMILY MEDICINE | Facility: CLINIC | Age: 34
End: 2024-09-16
Payer: COMMERCIAL

## 2024-09-27 ENCOUNTER — PATIENT MESSAGE (OUTPATIENT)
Dept: FAMILY MEDICINE | Facility: CLINIC | Age: 34
End: 2024-09-27
Payer: COMMERCIAL

## 2024-09-27 ENCOUNTER — TELEPHONE (OUTPATIENT)
Dept: FAMILY MEDICINE | Facility: CLINIC | Age: 34
End: 2024-09-27
Payer: COMMERCIAL

## 2024-09-30 ENCOUNTER — TELEPHONE (OUTPATIENT)
Dept: FAMILY MEDICINE | Facility: CLINIC | Age: 34
End: 2024-09-30
Payer: COMMERCIAL

## 2025-03-31 ENCOUNTER — OFFICE VISIT (OUTPATIENT)
Dept: FAMILY MEDICINE | Facility: CLINIC | Age: 35
End: 2025-03-31
Payer: COMMERCIAL

## 2025-03-31 VITALS
HEIGHT: 66 IN | SYSTOLIC BLOOD PRESSURE: 122 MMHG | WEIGHT: 150 LBS | OXYGEN SATURATION: 98 % | BODY MASS INDEX: 24.11 KG/M2 | DIASTOLIC BLOOD PRESSURE: 60 MMHG | RESPIRATION RATE: 16 BRPM | HEART RATE: 56 BPM

## 2025-03-31 DIAGNOSIS — R79.9 ABNORMAL BLOOD CHEMISTRY: ICD-10-CM

## 2025-03-31 DIAGNOSIS — E53.8 B12 DEFICIENCY: Primary | ICD-10-CM

## 2025-03-31 DIAGNOSIS — Z13.6 ENCOUNTER FOR LIPID SCREENING FOR CARDIOVASCULAR DISEASE: ICD-10-CM

## 2025-03-31 DIAGNOSIS — Z13.220 ENCOUNTER FOR LIPID SCREENING FOR CARDIOVASCULAR DISEASE: ICD-10-CM

## 2025-03-31 DIAGNOSIS — Z11.59 ENCOUNTER FOR SCREENING FOR VIRAL DISEASE: ICD-10-CM

## 2025-03-31 DIAGNOSIS — E55.9 VITAMIN D DEFICIENCY DISEASE: ICD-10-CM

## 2025-03-31 DIAGNOSIS — E61.1 IRON DEFICIENCY: ICD-10-CM

## 2025-03-31 DIAGNOSIS — F31.9 BIPOLAR 1 DISORDER: ICD-10-CM

## 2025-03-31 PROCEDURE — 99999 PR PBB SHADOW E&M-EST. PATIENT-LVL IV: CPT | Mod: PBBFAC,,, | Performed by: STUDENT IN AN ORGANIZED HEALTH CARE EDUCATION/TRAINING PROGRAM

## 2025-03-31 PROCEDURE — 99214 OFFICE O/P EST MOD 30 MIN: CPT | Mod: PBBFAC | Performed by: STUDENT IN AN ORGANIZED HEALTH CARE EDUCATION/TRAINING PROGRAM

## 2025-03-31 RX ORDER — DESVENLAFAXINE 100 MG/1
100 TABLET, EXTENDED RELEASE ORAL DAILY
COMMUNITY
Start: 2024-08-26

## 2025-03-31 NOTE — PROGRESS NOTES
SUBJECTIVE:    CHIEF COMPLAINT:   Chief Complaint   Patient presents with    Health Maintenance     Pt in need of lab work           274}    HISTORY OF PRESENT ILLNESS:  Emely Rosario is a 34 y.o. female who presents to the clinic today   History of Present Illness    CHIEF COMPLAINT:  Ms. Rosario presents today for lab work follow up    LABS:  Previous lab work showed low folic acid and B12 levels despite taking bariatric vitamins. Iron levels were elevated above normal limits. Vitamin D was at the low end of normal range. STD panel was completed.    PSYCHIATRIC HISTORY:  She is currently under the care of Dr. Cornelius for bipolar disorder management, reporting good progress with treatment. She continues Vraylar long-term for bipolar disorder and started Prestiq in June of last year.    SURGICAL HISTORY:  She has a history of bariatric surgery.    MEDICATIONS:  She takes bariatric vitamins and supplements.    CONTRACEPTIVE HISTORY:  She recently had Nexplanon removed and is seeking short-term contraception due to future pregnancy plans. Her previous contraceptive history includes Mirena IUD for 3-5 years, which was replaced but resulted in pregnancy approximately two months after insertion. Prior to IUDs, she used oral contraceptives. She currently prefers short-term contraception options due to upcoming travel plans.      ROS:  General: -fever, -chills, -fatigue, -weight gain, -weight loss  Eyes: -vision changes, -redness, -discharge  ENT: -ear pain, -nasal congestion, -sore throat  Cardiovascular: -chest pain, -palpitations, -lower extremity edema  Respiratory: -cough, -shortness of breath  Gastrointestinal: -abdominal pain, -nausea, -vomiting, -diarrhea, -constipation, -blood in stool  Genitourinary: -dysuria, -hematuria, -frequency  Musculoskeletal: -joint pain, -muscle pain  Skin: -rash, -lesion  Neurological: -headache, -dizziness, -numbness, -tingling  Psychiatric: -anxiety, -depression, -sleep difficulty       "    PAST MEDICAL HISTORY:     274}  Past Medical History:   Diagnosis Date    Bipolar disorder     Depression     PTSD (post-traumatic stress disorder)     PTSD (post-traumatic stress disorder)     Substance abuse        PAST SURGICAL HISTORY:  Past Surgical History:   Procedure Laterality Date    BREAST MASS EXCISION Left 2018    BREAST SURGERY       SECTION  2019    gastric sleve Bilateral 2023       SOCIAL HISTORY:  Social History[1]    FAMILY HISTORY:       Family History   Problem Relation Name Age of Onset    Cancer Maternal Grandfather      Prostate cancer Maternal Grandfather      Breast cancer Mother      Colon cancer Mother      Depression Mother      Alcohol abuse Mother      Cancer Mother          COLON, BREAST    Anxiety disorder Mother      Ovarian cancer Maternal Aunt         ALLERGIES AND MEDICATIONS: updated and reviewed.      274}  Review of patient's allergies indicates:   Allergen Reactions    Latex Rash     Itchy, red splotchy.  Reports lips feel tingling when blowing up balloons.     Medication List with Changes/Refills   Current Medications    CARIPRAZINE (VRAYLAR) 3 MG CAP    Take 1 capsule (3 mg total) by mouth once daily.    DESVENLAFAXINE SUCCINATE (PRISTIQ) 100 MG TB24    Take 100 mg by mouth once daily.       SCREENING HISTORY:    274}  Health Maintenance         Date Due Completion Date    Hepatitis C Screening Never done ---    Influenza Vaccine (1) 2024    COVID-19 Vaccine ( - - season) Never done ---    Cervical Cancer Screening 2029    TETANUS VACCINE 2032    RSV Vaccine (Age 60+ and Pregnant patients) (1 - 1-dose 75+ series) 2065 ---            REVIEW OF SYSTEMS:   ROS    PHYSICAL EXAM:      274}  /60 (BP Location: Left arm, Patient Position: Sitting)   Pulse (!) 56   Resp 16   Ht 5' 5.98" (1.676 m)   Wt 68 kg (150 lb)   SpO2 98%   BMI 24.22 kg/m²   Wt Readings from Last 3 Encounters:   25 " "68 kg (150 lb)   02/18/25 70.3 kg (155 lb)   07/05/24 82.6 kg (182 lb)     BP Readings from Last 3 Encounters:   03/31/25 122/60   02/18/25 106/74   07/05/24 98/60     Estimated body mass index is 24.22 kg/m² as calculated from the following:    Height as of this encounter: 5' 5.98" (1.676 m).    Weight as of this encounter: 68 kg (150 lb).     Physical Exam  HENT:      Head: Normocephalic and atraumatic.      Nose: Nose normal.      Mouth/Throat:      Mouth: Mucous membranes are dry.      Pharynx: Oropharynx is clear.   Eyes:      Extraocular Movements: Extraocular movements intact.   Cardiovascular:      Rate and Rhythm: Normal rate and regular rhythm.   Pulmonary:      Effort: Pulmonary effort is normal.      Breath sounds: Normal breath sounds.   Abdominal:      General: Bowel sounds are normal.      Palpations: Abdomen is soft.   Musculoskeletal:         General: Normal range of motion.      Cervical back: Normal range of motion.   Skin:     General: Skin is warm.   Neurological:      General: No focal deficit present.      Mental Status: She is alert. Mental status is at baseline.   Psychiatric:         Mood and Affect: Mood normal.         Thought Content: Thought content normal.         LABS:   274}  I have reviewed old labs below:  Lab Results   Component Value Date    WBC 4.40 02/20/2024    HGB 13.3 02/20/2024    HCT 41.0 02/20/2024    MCV 94 02/20/2024     02/20/2024     02/20/2024    K 4.1 02/20/2024     02/20/2024    CALCIUM 9.4 02/20/2024    CO2 26 02/20/2024    GLU 93 02/20/2024    BUN 11 02/20/2024    CREATININE 0.8 02/20/2024    ANIONGAP 9 02/20/2024    PROT 6.7 02/20/2024    ALBUMIN 4.0 02/20/2024    BILITOT 0.7 02/20/2024    ALKPHOS 74 02/20/2024    ALT 14 02/20/2024    AST 16 02/20/2024    CHOL 169 02/20/2024    TRIG 71 02/20/2024    HDL 42 02/20/2024    LDLCALC 112.8 02/20/2024    TSH 1.139 02/20/2024    HGBA1C 4.8 02/20/2024       ASSESSMENT AND PLAN:  274}  Assessment & Plan "    IMPRESSION:  Reviewed contraceptive history, including previous use of IUD and Nexplanon.  Considered short-term contraceptive options due to future pregnancy plans.  Evaluated current medication regimen, including Vraylar and PRESTIQ.  Previous lab results showed low folic acid and vitamin B12 levels, slightly elevated iron levels.  Considered adjusting vitamin supplementation to address nutritional needs without excess iron.  Discussed Depo-Provera as a potential short-term contraceptive option, clarifying its duration of effectiveness    BIPOLAR DISORDER:  - Confirmed that the patient is still seeing Dr. Cornelius for therapy and management of bipolar disorder.  - Ms. Rosario reports that their bipolar management is going well.  - Acknowledged that Dr. Cornelius is managing the patient's bipolar disorder and medications.  - Noted that the patient is on Valproate for bipolar disorder and started Prestiq last June, which are being monitored by Dr. Thompson.  - Discussed plan to wean off medications if the patient becomes pregnant, with alternative options to be considered.    VITAMIN B DEFICIENCY:  - Ordered labs to reassess folic acid and vitamin B12 levels.  - Noted that previous labs showed folic acid and vitamin B12 on the low end of normal.  - Evaluated that the patient is taking bariatric vitamins, but levels are still low.  - Considering switching the patient to specific folic acid and B12 supplements.  - Instructed the patient to contact the office when lab results become available.  - Plan to adjust vitamin supplementation based on new labs results.  - Ordered labs to reassess vitamin B12 levels.  - Noted that previous labs showed vitamin B12 on the low end of normal.  - Evaluated that the patient is taking bariatric vitamins, but B12 levels are still low.  - Considering switching the patient to a specific B12 supplement.  - Instructed the patient to contact the office when lab results become available.  - Plan to  adjust vitamin B supplementation based on new labs results.    Elevated iron  - Noted that previous labs showed iron levels slightly above the normal limit.  - Evaluated that iron levels are slightly elevated despite the patient taking bariatric vitamins.  - Plan to closely monitor iron levels.  - Considering removing iron from the patient's vitamin supplementation pending new lab results.  - Ordered new labs to reassess iron levels.    BARIATRIC SURGERY STATUS:  - Noted the patient's history of bariatric surgery and current use of bariatric vitamins.  - Evaluated that the patient's vitamin levels are low despite taking bariatric vitamins.  - Considering adjusting vitamin supplementation due to post-bariatric surgery needs.  - Ordered new labs to reassess vitamin levels.  - Plan to adjust vitamin supplementation based on new labs results, taking into account post-bariatric surgery needs.        1. B12 deficiency  - Vitamin B12; Future  - Folate; Future    2. Vitamin D deficiency disease  - Vitamin D 25-Hydroxy; Future    3. Iron deficiency  - Iron and TIBC; Future  - Ferritin; Future    4. Bipolar 1 disorder  - CBC Auto Differential; Future  - Comprehensive Metabolic Panel; Future  - TSH; Future    5. Encounter for lipid screening for cardiovascular disease  - Lipid Panel; Future    6. Abnormal blood chemistry  - Hemoglobin A1C; Future    7. Encounter for screening for viral disease  - Hepatitis C Antibody; Future         Orders Placed This Encounter   Procedures    CBC Auto Differential    Comprehensive Metabolic Panel    Hemoglobin A1C    Lipid Panel    TSH    Vitamin D 25-Hydroxy    Iron and TIBC    Ferritin    Vitamin B12    Folate    Hepatitis C Antibody       Follow up in about 1 year (around 3/31/2026). or sooner as needed.                 [1]   Social History  Socioeconomic History    Marital status:    Tobacco Use    Smoking status: Former     Types: Cigarettes, Vaping w/o nicotine     Start date:  2024    Smokeless tobacco: Never   Substance and Sexual Activity    Alcohol use: Never    Drug use: Never    Sexual activity: Yes     Partners: Male     Birth control/protection: Implant     Social Drivers of Health     Financial Resource Strain: Low Risk  (3/31/2025)    Overall Financial Resource Strain (CARDIA)     Difficulty of Paying Living Expenses: Not very hard   Food Insecurity: No Food Insecurity (3/31/2025)    Hunger Vital Sign     Worried About Running Out of Food in the Last Year: Never true     Ran Out of Food in the Last Year: Never true   Transportation Needs: No Transportation Needs (3/31/2025)    PRAPARE - Transportation     Lack of Transportation (Medical): No     Lack of Transportation (Non-Medical): No   Physical Activity: Sufficiently Active (3/31/2025)    Exercise Vital Sign     Days of Exercise per Week: 5 days     Minutes of Exercise per Session: 60 min   Stress: Stress Concern Present (3/31/2025)    [employer] - Occupational Stress Questionnaire     Feeling of Stress : To some extent   Housing Stability: Low Risk  (3/31/2025)    Housing Stability Vital Sign     Unable to Pay for Housing in the Last Year: No     Number of Times Moved in the Last Year: 0     Homeless in the Last Year: No

## 2025-04-03 ENCOUNTER — LAB VISIT (OUTPATIENT)
Dept: LAB | Facility: HOSPITAL | Age: 35
End: 2025-04-03
Attending: STUDENT IN AN ORGANIZED HEALTH CARE EDUCATION/TRAINING PROGRAM
Payer: COMMERCIAL

## 2025-04-03 ENCOUNTER — RESULTS FOLLOW-UP (OUTPATIENT)
Dept: FAMILY MEDICINE | Facility: CLINIC | Age: 35
End: 2025-04-03

## 2025-04-03 DIAGNOSIS — Z13.220 ENCOUNTER FOR LIPID SCREENING FOR CARDIOVASCULAR DISEASE: ICD-10-CM

## 2025-04-03 DIAGNOSIS — F31.9 BIPOLAR 1 DISORDER: ICD-10-CM

## 2025-04-03 DIAGNOSIS — R79.9 ABNORMAL BLOOD CHEMISTRY: ICD-10-CM

## 2025-04-03 DIAGNOSIS — Z11.59 ENCOUNTER FOR SCREENING FOR VIRAL DISEASE: ICD-10-CM

## 2025-04-03 DIAGNOSIS — E53.8 B12 DEFICIENCY: ICD-10-CM

## 2025-04-03 DIAGNOSIS — Z13.6 ENCOUNTER FOR LIPID SCREENING FOR CARDIOVASCULAR DISEASE: ICD-10-CM

## 2025-04-03 DIAGNOSIS — E55.9 VITAMIN D DEFICIENCY DISEASE: ICD-10-CM

## 2025-04-03 DIAGNOSIS — E61.1 IRON DEFICIENCY: ICD-10-CM

## 2025-04-03 LAB
25(OH)D3+25(OH)D2 SERPL-MCNC: 44 NG/ML (ref 30–96)
ABSOLUTE EOSINOPHIL (OHS): 0.04 K/UL
ABSOLUTE MONOCYTE (OHS): 0.25 K/UL (ref 0.3–1)
ABSOLUTE NEUTROPHIL COUNT (OHS): 2.93 K/UL (ref 1.8–7.7)
ALBUMIN SERPL BCP-MCNC: 3.9 G/DL (ref 3.5–5.2)
ALP SERPL-CCNC: 63 UNIT/L (ref 40–150)
ALT SERPL W/O P-5'-P-CCNC: 25 UNIT/L (ref 10–44)
ANION GAP (OHS): 8 MMOL/L (ref 8–16)
AST SERPL-CCNC: 18 UNIT/L (ref 11–45)
BASOPHILS # BLD AUTO: 0.02 K/UL
BASOPHILS NFR BLD AUTO: 0.4 %
BILIRUB SERPL-MCNC: 0.6 MG/DL (ref 0.1–1)
BUN SERPL-MCNC: 12 MG/DL (ref 6–20)
CALCIUM SERPL-MCNC: 9.2 MG/DL (ref 8.7–10.5)
CHLORIDE SERPL-SCNC: 108 MMOL/L (ref 95–110)
CHOLEST SERPL-MCNC: 148 MG/DL (ref 120–199)
CHOLEST/HDLC SERPL: 2.8 {RATIO} (ref 2–5)
CO2 SERPL-SCNC: 26 MMOL/L (ref 23–29)
CREAT SERPL-MCNC: 0.8 MG/DL (ref 0.5–1.4)
EAG (OHS): 97 MG/DL (ref 68–131)
ERYTHROCYTE [DISTWIDTH] IN BLOOD BY AUTOMATED COUNT: 11.9 % (ref 11.5–14.5)
FERRITIN SERPL-MCNC: 160 NG/ML (ref 20–300)
FOLATE SERPL-MCNC: 6.9 NG/ML (ref 4–24)
GFR SERPLBLD CREATININE-BSD FMLA CKD-EPI: >60 ML/MIN/1.73/M2
GLUCOSE SERPL-MCNC: 92 MG/DL (ref 70–110)
HBA1C MFR BLD: 5 % (ref 4–5.6)
HCT VFR BLD AUTO: 40 % (ref 37–48.5)
HCV AB SERPL QL IA: NORMAL
HDLC SERPL-MCNC: 52 MG/DL (ref 40–75)
HDLC SERPL: 35.1 % (ref 20–50)
HGB BLD-MCNC: 13.1 GM/DL (ref 12–16)
IMM GRANULOCYTES # BLD AUTO: 0.02 K/UL (ref 0–0.04)
IMM GRANULOCYTES NFR BLD AUTO: 0.4 % (ref 0–0.5)
IRON SATN MFR SERPL: 42 % (ref 20–50)
IRON SERPL-MCNC: 113 UG/DL (ref 30–160)
LDLC SERPL CALC-MCNC: 82.4 MG/DL (ref 63–159)
LYMPHOCYTES # BLD AUTO: 1.41 K/UL (ref 1–4.8)
MCH RBC QN AUTO: 30 PG (ref 27–31)
MCHC RBC AUTO-ENTMCNC: 32.8 G/DL (ref 32–36)
MCV RBC AUTO: 92 FL (ref 82–98)
NONHDLC SERPL-MCNC: 96 MG/DL
NUCLEATED RBC (/100WBC) (OHS): 0 /100 WBC
PLATELET # BLD AUTO: 219 K/UL (ref 150–450)
PMV BLD AUTO: 11.2 FL (ref 9.2–12.9)
POTASSIUM SERPL-SCNC: 4 MMOL/L (ref 3.5–5.1)
PROT SERPL-MCNC: 6.3 GM/DL (ref 6–8.4)
RBC # BLD AUTO: 4.36 M/UL (ref 4–5.4)
RELATIVE EOSINOPHIL (OHS): 0.9 %
RELATIVE LYMPHOCYTE (OHS): 30.2 % (ref 18–48)
RELATIVE MONOCYTE (OHS): 5.4 % (ref 4–15)
RELATIVE NEUTROPHIL (OHS): 62.7 % (ref 38–73)
SODIUM SERPL-SCNC: 142 MMOL/L (ref 136–145)
T4 FREE SERPL-MCNC: NORMAL NG/DL
TIBC SERPL-MCNC: 272 UG/DL (ref 250–450)
TRANSFERRIN SERPL-MCNC: 184 MG/DL (ref 200–375)
TRIGL SERPL-MCNC: 68 MG/DL (ref 30–150)
TSH SERPL-ACNC: 1.64 UIU/ML (ref 0.4–4)
VIT B12 SERPL-MCNC: 347 PG/ML (ref 210–950)
WBC # BLD AUTO: 4.67 K/UL (ref 3.9–12.7)

## 2025-04-03 PROCEDURE — 84443 ASSAY THYROID STIM HORMONE: CPT

## 2025-04-03 PROCEDURE — 80053 COMPREHEN METABOLIC PANEL: CPT

## 2025-04-03 PROCEDURE — 83540 ASSAY OF IRON: CPT

## 2025-04-03 PROCEDURE — 82607 VITAMIN B-12: CPT

## 2025-04-03 PROCEDURE — 36415 COLL VENOUS BLD VENIPUNCTURE: CPT

## 2025-04-03 PROCEDURE — 83036 HEMOGLOBIN GLYCOSYLATED A1C: CPT

## 2025-04-03 PROCEDURE — 86803 HEPATITIS C AB TEST: CPT

## 2025-04-03 PROCEDURE — 82746 ASSAY OF FOLIC ACID SERUM: CPT

## 2025-04-03 PROCEDURE — 82728 ASSAY OF FERRITIN: CPT

## 2025-04-03 PROCEDURE — 85025 COMPLETE CBC W/AUTO DIFF WBC: CPT

## 2025-04-03 PROCEDURE — 82306 VITAMIN D 25 HYDROXY: CPT

## 2025-04-03 PROCEDURE — 80061 LIPID PANEL: CPT

## 2025-06-12 ENCOUNTER — PATIENT MESSAGE (OUTPATIENT)
Dept: MATERNAL FETAL MEDICINE | Facility: CLINIC | Age: 35
End: 2025-06-12
Payer: COMMERCIAL